# Patient Record
Sex: FEMALE | Race: ASIAN | NOT HISPANIC OR LATINO
[De-identification: names, ages, dates, MRNs, and addresses within clinical notes are randomized per-mention and may not be internally consistent; named-entity substitution may affect disease eponyms.]

---

## 2019-06-11 ENCOUNTER — HOSPITAL (OUTPATIENT)
Dept: OTHER | Age: 64
End: 2019-06-11
Attending: INTERNAL MEDICINE

## 2019-11-01 DIAGNOSIS — Z12.39 SCREENING BREAST EXAMINATION: Primary | ICD-10-CM

## 2019-11-09 ENCOUNTER — HOSPITAL ENCOUNTER (OUTPATIENT)
Dept: MAMMOGRAPHY | Age: 64
Discharge: HOME | End: 2019-11-09
Attending: INTERNAL MEDICINE

## 2019-11-09 DIAGNOSIS — Z12.39 SCREENING BREAST EXAMINATION: ICD-10-CM

## 2019-11-09 PROCEDURE — 77067 SCR MAMMO BI INCL CAD: CPT

## 2024-08-13 ENCOUNTER — PREP FOR PROCEDURE (OUTPATIENT)
Dept: ORTHOPEDIC SURGERY | Age: 69
End: 2024-08-13

## 2024-09-04 ENCOUNTER — OFFICE VISIT (OUTPATIENT)
Dept: ORTHOPEDIC SURGERY | Age: 69
End: 2024-09-04

## 2024-09-04 DIAGNOSIS — S52.502A CLOSED FRACTURE OF DISTAL END OF LEFT RADIUS, UNSPECIFIED FRACTURE MORPHOLOGY, INITIAL ENCOUNTER: Primary | ICD-10-CM

## 2024-09-04 PROCEDURE — 99024 POSTOP FOLLOW-UP VISIT: CPT | Performed by: ORTHOPAEDIC SURGERY

## 2024-09-04 NOTE — PROGRESS NOTES
DIAGNOSIS:  Left distal radius 3 parts intra-articular displaced fracture, status post ORIF.    DATE OF SURGERY:  8/19/2024.    HISTORY OF PRESENT ILLNESS:  Ms. Garza 69 y.o. female right handed returns today  for 2 weeks postoperative visit.  The patient denies any significant pain in the left wrist. Rates pain a 0/10 VAS mild, aching, No numbness or tingling sensation. No fever or Chills. She  is in a splint.    PHYSICAL EXAMINATION:  The incision is completely healed .  No signs of any erythema or drainage. She  has no pain with the active or passive range of motion of the left wrist, but decrease ROM.  She  has intact sensation, distally, and she  is neurovascularly intact.    IMAGING:  Three views left wrist taken today in the office showed anatomic alignment of left distal radius, plate and screws in good position, no loosening.      IMPRESSION:  2 weeks out from left distal radius ORIF and doing very well.    PLAN:  I have told the patient to work on ROM, as well as strengthening exercises. A removable forearm brace applied. No heavy impact activities. The patient will come back for a follow up in 6 weeks.  At that time, we will take 3 views of the left wrist. PT if needed then.    As this patient has demonstrated risk factors for osteoporosis, such as age and evidence of a fracture, I have referred the patient back to the primary care physician for evaluation for osteoporosis, including consideration for DEXA scanning, if this is felt to be clinically indicated.  The patient is advised to contact the primary care physician to follow-up for further evaluation.        Akhil To MD

## 2024-10-16 ENCOUNTER — OFFICE VISIT (OUTPATIENT)
Dept: ORTHOPEDIC SURGERY | Age: 69
End: 2024-10-16

## 2024-10-16 VITALS — BODY MASS INDEX: 32.66 KG/M2 | HEIGHT: 59 IN | WEIGHT: 162 LBS

## 2024-10-16 DIAGNOSIS — S52.502A CLOSED FRACTURE OF DISTAL END OF LEFT RADIUS, UNSPECIFIED FRACTURE MORPHOLOGY, INITIAL ENCOUNTER: Primary | ICD-10-CM

## 2024-10-16 PROCEDURE — 99024 POSTOP FOLLOW-UP VISIT: CPT | Performed by: NURSE PRACTITIONER

## 2024-10-16 NOTE — PROGRESS NOTES
DIAGNOSIS:  Left distal radius 3 parts intra-articular displaced fracture, status post ORIF.    DATE OF SURGERY:  8/19/2024.    HISTORY OF PRESENT ILLNESS:  Ms. Garza 69 y.o. female right handed returns today  for 8 weeks postoperative visit.  The patient denies any significant pain in the left wrist. Rates pain a 0-2/10 VAS mild, aching, intermittent and improving. Worse with activity and better with rest. No numbness or tingling sensation. No fever or Chills. She  is in a brace. She is here with a [facility] .    PHYSICAL EXAMINATION:  The incision is completely healed .  No signs of any erythema or drainage. She  has no pain with the active or passive range of motion of the left wrist, but decrease ROM.  She  has intact sensation, distally, and she  is neurovascularly intact.    IMAGING:  Three views left wrist taken today in the office showed anatomic alignment of left distal radius, plate and screws in good position, no loosening.      IMPRESSION:  8 weeks out from left distal radius ORIF and doing very well.    PLAN:  I have told the patient to work on ROM, as well as strengthening exercises. She would like to do it on her own. She can discontinue the forearm brace. No heavy impact activities. The patient will come back for a follow up in 6 weeks.  At that time, we will take 3 views of the left wrist.     As this patient has demonstrated risk factors for osteoporosis, such as age and evidence of a fracture, I have referred the patient back to the primary care physician for evaluation for osteoporosis, including consideration for DEXA scanning, if this is felt to be clinically indicated.  The patient is advised to contact the primary care physician to follow-up for further evaluation.        Makayla Elizabeth, TERRY - CNP